# Patient Record
Sex: FEMALE | Race: WHITE | NOT HISPANIC OR LATINO | Employment: UNEMPLOYED | URBAN - NONMETROPOLITAN AREA
[De-identification: names, ages, dates, MRNs, and addresses within clinical notes are randomized per-mention and may not be internally consistent; named-entity substitution may affect disease eponyms.]

---

## 2024-02-05 ENCOUNTER — OFFICE VISIT (OUTPATIENT)
Dept: OBSTETRICS AND GYNECOLOGY | Facility: CLINIC | Age: 64
End: 2024-02-05
Payer: COMMERCIAL

## 2024-02-05 VITALS
DIASTOLIC BLOOD PRESSURE: 68 MMHG | SYSTOLIC BLOOD PRESSURE: 126 MMHG | HEIGHT: 63 IN | BODY MASS INDEX: 20.45 KG/M2 | WEIGHT: 115.4 LBS

## 2024-02-05 DIAGNOSIS — Z12.4 PAP SMEAR FOR CERVICAL CANCER SCREENING: ICD-10-CM

## 2024-02-05 DIAGNOSIS — Z12.31 ENCOUNTER FOR SCREENING MAMMOGRAM FOR MALIGNANT NEOPLASM OF BREAST: ICD-10-CM

## 2024-02-05 DIAGNOSIS — Z01.419 ENCOUNTER FOR GYNECOLOGICAL EXAMINATION WITHOUT ABNORMAL FINDING: ICD-10-CM

## 2024-02-05 PROCEDURE — 99396 PREV VISIT EST AGE 40-64: CPT | Performed by: OBSTETRICS & GYNECOLOGY

## 2024-02-05 PROCEDURE — 88175 CYTOPATH C/V AUTO FLUID REDO: CPT

## 2024-02-05 NOTE — PROGRESS NOTES
"Tiny Hong is a 63 y.o. female who is here for a routine exam. PCP = Scott Freire MD    Chief Complaint   Patient presents with    Gynecologic Exam        Presents for annual exam. She voices no complaints and is doing well. Denies any bowel or bladder problems. Denies any breast problems.  Denies any postmenopausal bleeding.    OB History    No obstetric history on file.          Past Medical History:   Diagnosis Date    Asymptomatic menopausal state 10/11/2019    Menopause present    Encounter for gynecological examination (general) (routine) without abnormal findings 2022    Pap test, as part of routine gynecological examination    Other conditions influencing health status     Menstruation       Past Surgical History:   Procedure Laterality Date    BREAST BIOPSY      CERVICAL BIOPSY  W/ LOOP ELECTRODE EXCISION      CT HEAD ANGIO W AND WO IV CONTRAST  2021    CT HEAD ANGIO W AND WO IV CONTRAST 2021 [facility]    DILATION AND CURETTAGE OF UTERUS      MASTECTOMY Right 2022    OTHER SURGICAL HISTORY  2019    Surgically induced     OTHER SURGICAL HISTORY  2022    Cataract surgery       Past med hx and past surg hx reviewed and notable for: none    Review of Systems:   Constitutional: No fever or chills  Respiratory: No shortness of breath, or cough  Cardiovascular: No chest pain or syncope  Breasts: No breast pain, no masses, no nipple discharge  Gastrointestinal: No nausea, vomiting, or diarrhea, no abdominal pain  Genitourinary: No dysuria or frequency  Gynecology: Negative except as noted in history of present illness  All other: All other systems reviewed and negative for complaint    Objective   /68   Ht 1.6 m (5' 3\")   Wt 52.3 kg (115 lb 6.4 oz)   BMI 20.44 kg/m²     PHYSICAL EXAMINATION:  Well-developed, well nourished, in no acute distress, alert and oriented x three, is pleasant and cooperative.   HEENT: Clear. Pupils equal, round and " reactive to light and accommodation. Extraocular muscles are intact. Oral mucosa pink without exudate.   NECK: No lymphadenopathy, no thyromegaly.  BREASTS: Symmetric, no palpable masses. No nipple discharge or retraction.  Noted right breast reconstruction with implant.  LUNGS: Clear bilaterally.  HEART: Regular rate and rhythm without murmurs.  ABDOMEN: Normoactive bowel sounds, soft and nontender, no guarding or rebound tenderness, no CVA tenderness.  EXTREMITIES: No clubbing, cyanosis or edema.  NEUROLOGIC:  Cranial nerves II-XII grossly intact.  :  Normal external female genitalia, normal vulva, normal vagina. Normal urethral meatus, urethra and bladder. Normal appearing cervix. Normal-sized uterus, no adnexal masses or tenderness. Pap smear performed today.      Actions performed during this visit include:  - Clinical breast exam  - Clinical pelvic exam  -   Orders Placed This Encounter   Procedures    BI mammo bilateral screening tomosynthesis     Standing Status:   Future     Standing Expiration Date:   4/5/2025     Order Specific Question:   Reason for exam:     Answer:   Routine     Order Specific Question:   Radiologist to Determine Optimal Study     Answer:   Yes     Order Specific Question:   Release result to PatientKeeper     Answer:   Immediate [1]     Order Specific Question:   Is this exam part of a Research Study? If Yes, link this order to the research study     Answer:   No        Problem List Items Addressed This Visit    None  Visit Diagnoses       Encounter for gynecological examination without abnormal finding        Relevant Orders    THINPREP PAP    Pap smear for cervical cancer screening        Relevant Orders    THINPREP PAP    Encounter for screening mammogram for malignant neoplasm of breast        Relevant Orders    BI mammo bilateral screening tomosynthesis             Provider Impression:  1.  Annual  2.  Screening mammogram    Thank you for coming to your annual exam. Your findings  during the exam were normal.  Please return for your next visit in 1 year.

## 2024-02-16 ENCOUNTER — HOSPITAL ENCOUNTER (OUTPATIENT)
Dept: RADIOLOGY | Facility: CLINIC | Age: 64
Discharge: HOME | End: 2024-02-16
Payer: COMMERCIAL

## 2024-02-16 DIAGNOSIS — Z12.31 ENCOUNTER FOR SCREENING MAMMOGRAM FOR MALIGNANT NEOPLASM OF BREAST: ICD-10-CM

## 2024-02-16 PROCEDURE — 77067 SCR MAMMO BI INCL CAD: CPT

## 2024-02-16 PROCEDURE — 77067 SCR MAMMO BI INCL CAD: CPT | Mod: LEFT SIDE | Performed by: RADIOLOGY

## 2024-02-16 PROCEDURE — 77067 SCR MAMMO BI INCL CAD: CPT | Mod: LT

## 2024-02-16 PROCEDURE — 77063 BREAST TOMOSYNTHESIS BI: CPT | Mod: LEFT SIDE | Performed by: RADIOLOGY

## 2024-02-17 LAB
CYTOLOGY CMNT CVX/VAG CYTO-IMP: NORMAL
LAB AP HPV GENOTYPE QUESTION: YES
LAB AP HPV HR: NORMAL
LABORATORY COMMENT REPORT: NORMAL
MENSTRUAL HX REPORTED: NORMAL
PATH REPORT.TOTAL CANCER: NORMAL

## 2024-04-11 ENCOUNTER — OFFICE VISIT (OUTPATIENT)
Dept: PLASTIC SURGERY | Facility: CLINIC | Age: 64
End: 2024-04-11
Payer: COMMERCIAL

## 2024-04-11 VITALS
SYSTOLIC BLOOD PRESSURE: 159 MMHG | HEART RATE: 75 BPM | DIASTOLIC BLOOD PRESSURE: 79 MMHG | HEIGHT: 63 IN | BODY MASS INDEX: 20.44 KG/M2

## 2024-04-11 DIAGNOSIS — Z98.890 S/P BREAST RECONSTRUCTION: Primary | ICD-10-CM

## 2024-04-11 PROCEDURE — 99212 OFFICE O/P EST SF 10 MIN: CPT | Performed by: PHYSICIAN ASSISTANT

## 2024-04-11 NOTE — PROGRESS NOTES
Department of Plastic and Reconstructive Surgery            Follow Up Visit    Date: 04/11/24  Date of Surgery: 12/9/2022    Subjective   Tiny Hong is a 63 y.o. female who presents for POV. They are s/p right SSM with DTI breast reconstruction with silicon implant 175cc  on 12/9/2022 with Dr. Robertson.         She presents for 1 year follow up. She has no complaints or concerns at this time. She has returned to normal activity and is horseback riding. She presents today for implant exam.     Objective   Vital Signs:   Vitals:    04/11/24 1000   BP: 159/79   Pulse: 75       Gen: interactive and pleasant  Head: NCAT  Eyes: EOMI, PERRLA  Mouth: MMM  Throat: trachea midline  Cor: RRR  Pulm: nonlabored breathing  Abd: s/nt/nd  Neuro: AAOx3  Ext: extremities perfused    Focused exam of the: right breast    Right breast with well healed surgical scar. There is descent of the implant into the inferior pole with ptosis of the implant. No evidence of irregular contour. There is no tenderness to palpation of the implant.      Assessment/Plan     Tiny Hong is a 63 y.o. female who presents for POV. They are s/p right SSM with DTI breast reconstruction with silicon implant 175cc  on 12/9/2022 with Dr. Robertson.       She presents for 1 year POV. She has no concerns at this time. The patient and I discussed yearly follow-up for breast exams and implant assessment. She understands that having silicone implants could lead to a potential silent rupture. We discussed the FDA recommendations for MRI surveillance 3 years after her initial placement and then subsequently every 2 years. I will plan to see her back on a yearly basis for implant checks.The patient understands that I'm happy to see her back sooner should she have any questions or concerns about her implants.    Plan:   Follow up in 1 year    I spent 15 minutes with this patient. Greater than 50% of this time was spent in the counselling and/or  coordination of care of this patient.  This note was created using voice recognition software and was not corrected for typographical or grammatical errors.    Signature: Kelli George PA-C  Date: 04/11/24

## 2025-02-07 ENCOUNTER — APPOINTMENT (OUTPATIENT)
Dept: OBSTETRICS AND GYNECOLOGY | Facility: CLINIC | Age: 65
End: 2025-02-07
Payer: COMMERCIAL

## 2025-02-07 VITALS
WEIGHT: 118.4 LBS | BODY MASS INDEX: 20.98 KG/M2 | SYSTOLIC BLOOD PRESSURE: 128 MMHG | DIASTOLIC BLOOD PRESSURE: 82 MMHG | HEIGHT: 63 IN

## 2025-02-07 DIAGNOSIS — Z01.419 ENCOUNTER FOR GYNECOLOGICAL EXAMINATION WITHOUT ABNORMAL FINDING: ICD-10-CM

## 2025-02-07 DIAGNOSIS — Z12.4 PAP SMEAR FOR CERVICAL CANCER SCREENING: ICD-10-CM

## 2025-02-07 DIAGNOSIS — Z12.31 ENCOUNTER FOR SCREENING MAMMOGRAM FOR MALIGNANT NEOPLASM OF BREAST: ICD-10-CM

## 2025-02-07 PROCEDURE — 99459 PELVIC EXAMINATION: CPT | Performed by: OBSTETRICS & GYNECOLOGY

## 2025-02-07 PROCEDURE — 3008F BODY MASS INDEX DOCD: CPT | Performed by: OBSTETRICS & GYNECOLOGY

## 2025-02-07 PROCEDURE — 88175 CYTOPATH C/V AUTO FLUID REDO: CPT

## 2025-02-07 PROCEDURE — 99396 PREV VISIT EST AGE 40-64: CPT | Performed by: OBSTETRICS & GYNECOLOGY

## 2025-02-07 SDOH — ECONOMIC STABILITY: TRANSPORTATION INSECURITY
IN THE PAST 12 MONTHS, HAS THE LACK OF TRANSPORTATION KEPT YOU FROM MEDICAL APPOINTMENTS OR FROM GETTING MEDICATIONS?: NO

## 2025-02-07 SDOH — ECONOMIC STABILITY: INCOME INSECURITY: IN THE LAST 12 MONTHS, WAS THERE A TIME WHEN YOU WERE NOT ABLE TO PAY THE MORTGAGE OR RENT ON TIME?: NO

## 2025-02-07 SDOH — ECONOMIC STABILITY: FOOD INSECURITY: WITHIN THE PAST 12 MONTHS, THE FOOD YOU BOUGHT JUST DIDN'T LAST AND YOU DIDN'T HAVE MONEY TO GET MORE.: NEVER TRUE

## 2025-02-07 SDOH — ECONOMIC STABILITY: TRANSPORTATION INSECURITY
IN THE PAST 12 MONTHS, HAS LACK OF TRANSPORTATION KEPT YOU FROM MEETINGS, WORK, OR FROM GETTING THINGS NEEDED FOR DAILY LIVING?: NO

## 2025-02-07 SDOH — ECONOMIC STABILITY: FOOD INSECURITY: WITHIN THE PAST 12 MONTHS, YOU WORRIED THAT YOUR FOOD WOULD RUN OUT BEFORE YOU GOT MONEY TO BUY MORE.: NEVER TRUE

## 2025-02-07 ASSESSMENT — PATIENT HEALTH QUESTIONNAIRE - PHQ9
2. FEELING DOWN, DEPRESSED OR HOPELESS: NOT AT ALL
1. LITTLE INTEREST OR PLEASURE IN DOING THINGS: NOT AT ALL
SUM OF ALL RESPONSES TO PHQ9 QUESTIONS 1 AND 2: 0

## 2025-02-07 ASSESSMENT — LIFESTYLE VARIABLES
HOW OFTEN DO YOU HAVE A DRINK CONTAINING ALCOHOL: NEVER
HOW MANY STANDARD DRINKS CONTAINING ALCOHOL DO YOU HAVE ON A TYPICAL DAY: PATIENT DOES NOT DRINK
SKIP TO QUESTIONS 9-10: 1
AUDIT-C TOTAL SCORE: 0
HOW OFTEN DO YOU HAVE SIX OR MORE DRINKS ON ONE OCCASION: NEVER

## 2025-02-07 NOTE — PROGRESS NOTES
Tiny Hong is a 64 y.o. female who is here for a routine exam. PCP = Scott Freire MD    Chief Complaint   Patient presents with    Gynecologic Exam     Patient is here for yearly exam and pap test. Patient does  do regular self breast exams and has no concerns at this time. LMP: diamond        Presents for annual exam. She voices no complaints and is doing well. Denies any bowel or bladder problems. Denies any breast problems.  Denies any postmenopausal bleeding.    OB History          2    Para   1    Term   1            AB   1    Living   1         SAB   0    IAB   1    Ectopic        Multiple        Live Births   1                  Past Medical History:   Diagnosis Date    Asymptomatic menopausal state 10/11/2019    Menopause present    Encounter for gynecological examination (general) (routine) without abnormal findings 2022    Pap test, as part of routine gynecological examination    HGSIL on Pap smear of cervix     Other conditions influencing health status     Menstruation       Past Surgical History:   Procedure Laterality Date    BREAST BIOPSY      CERVICAL BIOPSY  W/ LOOP ELECTRODE EXCISION      CT HEAD ANGIO W AND WO IV CONTRAST  2021    CT HEAD ANGIO W AND WO IV CONTRAST 2021 [facility]Y LE[facility]    DILATION AND CURETTAGE OF UTERUS  1983    MASTECTOMY Right 2022    OTHER SURGICAL HISTORY      Surgically induced     OTHER SURGICAL HISTORY  2022    Cataract surgery       Past med hx and past surg hx reviewed and notable for: none    Review of Systems:   Constitutional: No fever or chills  Respiratory: No shortness of breath, or cough  Cardiovascular: No chest pain or syncope  Breasts: No breast pain, no masses, no nipple discharge  Gastrointestinal: No nausea, vomiting, or diarrhea, no abdominal pain  Genitourinary: No dysuria or frequency  Gynecology: Negative except as noted in history of present illness  All other: All other systems  "reviewed and negative for complaint    Objective   /82   Ht 1.6 m (5' 3\")   Wt 53.7 kg (118 lb 6.4 oz)   BMI 20.97 kg/m²     PHYSICAL EXAMINATION:  Chaperone present for exam:  Savannah Yi MA ([place])  Well-developed, well nourished, in no acute distress, alert and oriented x three, is pleasant and cooperative.   HEENT: Clear. Pupils equal, round and reactive to light and accommodation. Extraocular muscles are intact. Oral mucosa pink without exudate.   NECK: No lymphadenopathy, no thyromegaly.  BREASTS: No palpable masses of the left breast. No nipple discharge or retraction.  Noted right breast reconstruction with implant.  LUNGS: Clear bilaterally.  HEART: Regular rate and rhythm without murmurs.  ABDOMEN: Normoactive bowel sounds, soft and nontender, no guarding or rebound tenderness, no CVA tenderness.  EXTREMITIES: No clubbing, cyanosis or edema.  NEUROLOGIC:  Cranial nerves II-XII grossly intact.  :  Normal external female genitalia, normal vulva, normal vagina. Normal urethral meatus, urethra and bladder.  Virginal speculum used for exam.  Normal appearing cervix. Normal-sized uterus, no adnexal masses or tenderness. Pap smear performed today.      Actions performed during this visit include:  - Clinical breast exam  - Clinical pelvic exam  -   Orders Placed This Encounter   Procedures    BI mammo bilateral screening tomosynthesis     Standing Status:   Future     Standing Expiration Date:   3/7/2026     Order Specific Question:   Reason for exam:     Answer:   Screening     Order Specific Question:   Radiologist to Determine Optimal Study     Answer:   Yes     Order Specific Question:   Release result to Repeatit     Answer:   Immediate [1]     Order Specific Question:   Is this exam part of a Research Study? If Yes, link this order to the research study     Answer:   No        Problem List Items Addressed This Visit    None  Visit Diagnoses       Encounter for gynecological examination " without abnormal finding        Relevant Orders    THINPREP PAP    Pap smear for cervical cancer screening        Relevant Orders    THINPREP PAP    Encounter for screening mammogram for malignant neoplasm of breast        Relevant Orders    BI mammo bilateral screening tomosynthesis             Provider Impression:  1.  Annual  2.  Screening mammogram    Thank you for coming to your annual exam. Your findings during the exam were normal.  Please return for your next visit in 1 year.

## 2025-02-17 ENCOUNTER — HOSPITAL ENCOUNTER (OUTPATIENT)
Dept: RADIOLOGY | Facility: CLINIC | Age: 65
Discharge: HOME | End: 2025-02-17
Payer: COMMERCIAL

## 2025-02-17 VITALS — HEIGHT: 63 IN | WEIGHT: 118 LBS | BODY MASS INDEX: 20.91 KG/M2

## 2025-02-17 DIAGNOSIS — Z12.31 ENCOUNTER FOR SCREENING MAMMOGRAM FOR MALIGNANT NEOPLASM OF BREAST: ICD-10-CM

## 2025-02-17 PROCEDURE — 77067 SCR MAMMO BI INCL CAD: CPT | Mod: 52,LT

## 2025-02-17 PROCEDURE — 77067 SCR MAMMO BI INCL CAD: CPT | Mod: LEFT SIDE | Performed by: RADIOLOGY

## 2025-02-17 PROCEDURE — 77063 BREAST TOMOSYNTHESIS BI: CPT | Mod: LEFT SIDE | Performed by: RADIOLOGY

## 2025-02-21 LAB
CYTOLOGY CMNT CVX/VAG CYTO-IMP: NORMAL
LAB AP HPV GENOTYPE QUESTION: YES
LAB AP HPV HR: NORMAL
LAB AP TREATMENT HISTORY: NORMAL
LABORATORY COMMENT REPORT: NORMAL
MENSTRUAL HX REPORTED: NORMAL
PATH REPORT.TOTAL CANCER: NORMAL

## 2025-04-10 ENCOUNTER — APPOINTMENT (OUTPATIENT)
Dept: PLASTIC SURGERY | Facility: CLINIC | Age: 65
End: 2025-04-10
Payer: COMMERCIAL

## 2025-04-10 VITALS — HEIGHT: 63 IN | BODY MASS INDEX: 20.91 KG/M2 | WEIGHT: 118 LBS

## 2025-04-10 DIAGNOSIS — Z98.890 S/P BREAST RECONSTRUCTION: Primary | ICD-10-CM

## 2025-04-10 PROCEDURE — 3008F BODY MASS INDEX DOCD: CPT | Performed by: PHYSICIAN ASSISTANT

## 2025-04-10 PROCEDURE — 99212 OFFICE O/P EST SF 10 MIN: CPT | Performed by: PHYSICIAN ASSISTANT

## 2025-04-10 NOTE — PROGRESS NOTES
Department of Plastic and Reconstructive Surgery            Follow Up Visit    Date: 04/10/25  Date of Surgery: 12/9/2022    Subjective   Tiny Hong is a 64 y.o. female who presents for POV. They are s/p right SSM with DTI breast reconstruction with silicon implant 175cc  on 12/9/2022 with Dr. Robertson.       She presents for annual follow up. She denied changes to implant size or shape. She denied trauma to the chest, she denied popping tearing pain in the reconstructed breast.     Objective   Vital Signs:   There were no vitals filed for this visit.      Gen: interactive and pleasant  Head: NCAT  Eyes: EOMI, PERRLA  Mouth: MMM  Throat: trachea midline  Cor: RRR  Pulm: nonlabored breathing  Abd: s/nt/nd  Neuro: AAOx3  Ext: extremities perfused    Focused exam of the: right breast    Right breast with well healed surgical scar. There is descent of the implant into the inferior pole with ptosis of the implant. Unchanged from previous exam. No tenderness to palpation. No firmness of the capsule.  No evidence of irregular contour.     Assessment/Plan     Tiny Hong is a 64 y.o. female who presents for POV. They are s/p right SSM with DTI breast reconstruction with silicon implant 175cc  on 12/9/2022 with Dr. Robertson.       She presents for annual follow up. She has no concerns at this time. The patient and I discussed yearly follow-up for breast exams and implant assessment. She understands that having silicone implants could lead to a potential silent rupture. We discussed the FDA recommendations for MRI surveillance 3 years after her initial placement and then subsequently every 2 years. I will plan to see her back on a yearly basis for implant checks.The patient understands that I'm happy to see her back sooner should she have any questions or concerns about her implants. We will plan to perform first MRI 12/2025    Plan:   Follow up in 1 year    I spent 15 minutes with this patient. Greater  than 50% of this time was spent in the counselling and/or coordination of care of this patient.  This note was created using voice recognition software and was not corrected for typographical or grammatical errors.    Signature: Kelli George PA-C

## 2026-02-10 ENCOUNTER — APPOINTMENT (OUTPATIENT)
Dept: OBSTETRICS AND GYNECOLOGY | Facility: CLINIC | Age: 66
End: 2026-02-10
Payer: COMMERCIAL

## 2026-04-06 ENCOUNTER — APPOINTMENT (OUTPATIENT)
Facility: CLINIC | Age: 66
End: 2026-04-06
Payer: COMMERCIAL